# Patient Record
Sex: MALE | Race: WHITE | Employment: FULL TIME
[De-identification: names, ages, dates, MRNs, and addresses within clinical notes are randomized per-mention and may not be internally consistent; named-entity substitution may affect disease eponyms.]

---

## 2023-04-06 ENCOUNTER — NURSE TRIAGE (OUTPATIENT)
Dept: OTHER | Facility: CLINIC | Age: 23
End: 2023-04-06

## 2023-04-06 ENCOUNTER — OFFICE VISIT (OUTPATIENT)
Dept: PRIMARY CARE CLINIC | Age: 23
End: 2023-04-06
Payer: COMMERCIAL

## 2023-04-06 VITALS
DIASTOLIC BLOOD PRESSURE: 62 MMHG | BODY MASS INDEX: 25.41 KG/M2 | WEIGHT: 198 LBS | SYSTOLIC BLOOD PRESSURE: 126 MMHG | OXYGEN SATURATION: 97 % | RESPIRATION RATE: 14 BRPM | HEART RATE: 117 BPM | HEIGHT: 74 IN

## 2023-04-06 DIAGNOSIS — R06.02 SOB (SHORTNESS OF BREATH): ICD-10-CM

## 2023-04-06 DIAGNOSIS — R73.09 IMPAIRED GLUCOSE REGULATION: ICD-10-CM

## 2023-04-06 DIAGNOSIS — Z11.4 ENCOUNTER FOR SCREENING FOR HIV: ICD-10-CM

## 2023-04-06 DIAGNOSIS — M60.9 MYOSITIS, UNSPECIFIED MYOSITIS TYPE, UNSPECIFIED SITE: ICD-10-CM

## 2023-04-06 DIAGNOSIS — R53.83 OTHER FATIGUE: ICD-10-CM

## 2023-04-06 DIAGNOSIS — R79.0 LOW MAGNESIUM LEVEL: ICD-10-CM

## 2023-04-06 DIAGNOSIS — Z87.898 HISTORY OF PROGRESSIVE WEAKNESS: ICD-10-CM

## 2023-04-06 DIAGNOSIS — Z00.00 ENCOUNTER FOR WELL ADULT EXAM WITHOUT ABNORMAL FINDINGS: Primary | ICD-10-CM

## 2023-04-06 DIAGNOSIS — Z23 NEED FOR VARICELLA VACCINE: ICD-10-CM

## 2023-04-06 DIAGNOSIS — Z11.59 ENCOUNTER FOR HEPATITIS C SCREENING TEST FOR LOW RISK PATIENT: ICD-10-CM

## 2023-04-06 DIAGNOSIS — E55.9 VITAMIN D DEFICIENCY: ICD-10-CM

## 2023-04-06 DIAGNOSIS — Z13.220 SCREENING CHOLESTEROL LEVEL: ICD-10-CM

## 2023-04-06 PROCEDURE — 99385 PREV VISIT NEW AGE 18-39: CPT | Performed by: INTERNAL MEDICINE

## 2023-04-06 SDOH — ECONOMIC STABILITY: FOOD INSECURITY: WITHIN THE PAST 12 MONTHS, YOU WORRIED THAT YOUR FOOD WOULD RUN OUT BEFORE YOU GOT MONEY TO BUY MORE.: NEVER TRUE

## 2023-04-06 SDOH — ECONOMIC STABILITY: FOOD INSECURITY: WITHIN THE PAST 12 MONTHS, THE FOOD YOU BOUGHT JUST DIDN'T LAST AND YOU DIDN'T HAVE MONEY TO GET MORE.: NEVER TRUE

## 2023-04-06 SDOH — ECONOMIC STABILITY: INCOME INSECURITY: HOW HARD IS IT FOR YOU TO PAY FOR THE VERY BASICS LIKE FOOD, HOUSING, MEDICAL CARE, AND HEATING?: NOT HARD AT ALL

## 2023-04-06 SDOH — HEALTH STABILITY: PHYSICAL HEALTH: ON AVERAGE, HOW MANY MINUTES DO YOU ENGAGE IN EXERCISE AT THIS LEVEL?: 60 MIN

## 2023-04-06 SDOH — HEALTH STABILITY: PHYSICAL HEALTH: ON AVERAGE, HOW MANY DAYS PER WEEK DO YOU ENGAGE IN MODERATE TO STRENUOUS EXERCISE (LIKE A BRISK WALK)?: 6 DAYS

## 2023-04-06 SDOH — ECONOMIC STABILITY: HOUSING INSECURITY
IN THE LAST 12 MONTHS, WAS THERE A TIME WHEN YOU DID NOT HAVE A STEADY PLACE TO SLEEP OR SLEPT IN A SHELTER (INCLUDING NOW)?: NO

## 2023-04-06 ASSESSMENT — PATIENT HEALTH QUESTIONNAIRE - PHQ9
SUM OF ALL RESPONSES TO PHQ QUESTIONS 1-9: 0
SUM OF ALL RESPONSES TO PHQ QUESTIONS 1-9: 0
1. LITTLE INTEREST OR PLEASURE IN DOING THINGS: 0
SUM OF ALL RESPONSES TO PHQ QUESTIONS 1-9: 0
2. FEELING DOWN, DEPRESSED OR HOPELESS: 0
SUM OF ALL RESPONSES TO PHQ QUESTIONS 1-9: 0
SUM OF ALL RESPONSES TO PHQ9 QUESTIONS 1 & 2: 0

## 2023-04-06 ASSESSMENT — ENCOUNTER SYMPTOMS
BACK PAIN: 0
CHEST TIGHTNESS: 0
TROUBLE SWALLOWING: 0
BLOOD IN STOOL: 0
SHORTNESS OF BREATH: 1
VOMITING: 0
SINUS PRESSURE: 0
DIARRHEA: 0
WHEEZING: 0
COUGH: 0
ABDOMINAL PAIN: 0
EYE PAIN: 0
EYE REDNESS: 0
COLOR CHANGE: 0
CONSTIPATION: 0
NAUSEA: 0
SORE THROAT: 0
ABDOMINAL DISTENTION: 0

## 2023-04-06 NOTE — TELEPHONE ENCOUNTER
Location of patient: [place]at Nazario call from [employer] at [employer] with Intern. Subjective: Caller states \"I am fatigued\"     Current Symptoms: Works out everyday, drinks plenty of fluids and electrolytes and eats healthy. Hard to go to work. Body aches. Wakes up with chest tightness on and off. Muscular tightness in chest in the mornings. Onset: several months ago; worsening    Associated Symptoms: reduced activity, increased sleepiness    Pain Severity: 0/10; N/A;     Temperature: denies fever     LMP: NA Pregnant: NA    Recommended disposition: Go to [facility] or Walk In as Backup    Care advice provided, patient verbalizes understanding; denies any other questions or concerns; instructed to call back for any new or worsening symptoms. Caller unsure if will go to [facility] or Walk In today. Warm transferred him to [facility], as he wants to get established with a new PCP. Attention Provider: Thank you for allowing me to participate in the care of your patient. The patient was connected to triage in response to information provided to the ECC/PSC. Please do not respond through this encounter as the response is not directed to a shared pool.     Reason for Disposition   MODERATE weakness (i.e., interferes with work, school, normal activities) and cause unknown  (Exceptions: Weakness with acute minor illness, or weakness from poor fluid intake.)    Protocols used: Weakness (Generalized) and Fatigue-ADULT-OH

## 2023-04-06 NOTE — ASSESSMENT & PLAN NOTE
We will get some CT chest.  Given history of progressive shortness of breath with study ended at the also like to rule out thymic structures.

## 2023-04-06 NOTE — PROGRESS NOTES
is not nervous/anxious. No Known Allergies      Prior to Visit Medications    Not on File       No past medical history on file. No past surgical history on file. Family History   Problem Relation Age of Onset    Cancer Maternal Grandfather         Prostate cancer       Social History     Tobacco Use    Smoking status: Never    Smokeless tobacco: Never   Substance Use Topics    Alcohol use: Yes     Alcohol/week: 2.0 standard drinks     Types: 2 Glasses of wine per week     Comment: Minimal    Drug use: Yes     Frequency: 1.0 times per week     Types: Marijuana (Weed)     Comment: THC and delta 9       Objective   /62 (Site: Left Upper Arm, Position: Sitting, Cuff Size: Large Adult)   Pulse (!) 117   Resp 14   Ht 6' 2.25\" (1.886 m)   Wt 198 lb (89.8 kg)   SpO2 97%   BMI 25.25 kg/m²   Wt Readings from Last 3 Encounters:   04/06/23 198 lb (89.8 kg)     There were no vitals filed for this visit. Physical Exam  Constitutional:       General: He is not in acute distress. Appearance: Normal appearance. He is not ill-appearing. HENT:      Head: Normocephalic and atraumatic. Right Ear: Tympanic membrane, ear canal and external ear normal. There is no impacted cerumen. Left Ear: Tympanic membrane, ear canal and external ear normal. There is no impacted cerumen. Mouth/Throat:      Mouth: Mucous membranes are moist.      Pharynx: No oropharyngeal exudate or posterior oropharyngeal erythema. Eyes:      Extraocular Movements: Extraocular movements intact. Conjunctiva/sclera: Conjunctivae normal.      Pupils: Pupils are equal, round, and reactive to light. Neck:      Vascular: No carotid bruit. Cardiovascular:      Rate and Rhythm: Normal rate and regular rhythm. Pulses: Normal pulses. Heart sounds: Normal heart sounds. No murmur heard. No gallop. Pulmonary:      Effort: Pulmonary effort is normal.      Breath sounds: Normal breath sounds.  No wheezing,

## 2023-04-06 NOTE — PATIENT INSTRUCTIONS
with other people. Go for a walk with a friend, try a free online fitness class, or join a sports league. Eat a variety of healthy foods. These include fruits, vegetables, whole grains, low-fat dairy, and lean protein. Choose healthy portions of food. You can use the Nutrition Facts label on food packages as a guide. Eat more fruits and vegetables. You could add vegetables to sandwiches or add fruit to cereal.   Drink water when you are thirsty. Limit soda, juice, and sports drinks. Try to exercise most days. Aim for at least 2½ hours of exercise each week. Keep moving. Work in the garden or take your dog on a walk. Use the stairs instead of the elevator. If you use tobacco or nicotine, try to quit. Ask your doctor about programs and medicines to help you quit. Limit alcohol. Men should have no more than 2 drinks a day. Women should have no more than 1. For some people, no alcohol is the best choice. Follow-up care is a key part of your treatment and safety. Be sure to make and go to all appointments, and call your doctor if you are having problems. It's also a good idea to know your test results and keep a list of the medicines you take. Where can you learn more? Go to http://www.lynn.com/ and enter U807 to learn more about \"A Healthy Lifestyle: Care Instructions. \"  Current as of: November 14, 2022               Content Version: 13.6  © 2006-2023 Healthwise, [employer]. Care instructions adapted under license by [facility] ([facility]). If you have questions about a medical condition or this instruction, always ask your healthcare professional. [place] [address] any warranty or liability for your use of this information. Heart-Healthy Diet   Sodium, Fat, and Cholesterol Controlled Diet       What Is a Heart Healthy Diet? A heart-healthy diet is one that limits sodium , certain types of fat , and cholesterol .  This type of diet is recommended for:   People

## 2023-04-07 DIAGNOSIS — Z11.59 ENCOUNTER FOR HEPATITIS C SCREENING TEST FOR LOW RISK PATIENT: ICD-10-CM

## 2023-04-07 DIAGNOSIS — R73.09 IMPAIRED GLUCOSE REGULATION: ICD-10-CM

## 2023-04-07 DIAGNOSIS — Z13.220 SCREENING CHOLESTEROL LEVEL: ICD-10-CM

## 2023-04-07 DIAGNOSIS — R06.02 SOB (SHORTNESS OF BREATH): ICD-10-CM

## 2023-04-07 DIAGNOSIS — Z23 NEED FOR VARICELLA VACCINE: ICD-10-CM

## 2023-04-07 DIAGNOSIS — R53.83 OTHER FATIGUE: ICD-10-CM

## 2023-04-07 DIAGNOSIS — Z00.00 ENCOUNTER FOR WELL ADULT EXAM WITHOUT ABNORMAL FINDINGS: ICD-10-CM

## 2023-04-07 DIAGNOSIS — R79.0 LOW MAGNESIUM LEVEL: ICD-10-CM

## 2023-04-07 DIAGNOSIS — E55.9 VITAMIN D DEFICIENCY: ICD-10-CM

## 2023-04-07 DIAGNOSIS — M60.9 MYOSITIS, UNSPECIFIED MYOSITIS TYPE, UNSPECIFIED SITE: ICD-10-CM

## 2023-04-07 DIAGNOSIS — Z87.898 HISTORY OF PROGRESSIVE WEAKNESS: ICD-10-CM

## 2023-04-07 DIAGNOSIS — Z11.4 ENCOUNTER FOR SCREENING FOR HIV: ICD-10-CM

## 2023-04-07 LAB
25(OH)D3 SERPL-MCNC: 25.7 NG/ML
ALBUMIN SERPL-MCNC: 4.3 G/DL (ref 3.4–5)
ALBUMIN/GLOB SERPL: 2 {RATIO} (ref 1.1–2.2)
ALP SERPL-CCNC: 65 U/L (ref 40–129)
ALT SERPL-CCNC: 16 U/L (ref 10–40)
ANION GAP SERPL CALCULATED.3IONS-SCNC: 15 MMOL/L (ref 3–16)
AST SERPL-CCNC: 16 U/L (ref 15–37)
BASOPHILS # BLD: 0 K/UL (ref 0–0.2)
BASOPHILS NFR BLD: 0.3 %
BILIRUB SERPL-MCNC: 0.8 MG/DL (ref 0–1)
BUN SERPL-MCNC: 19 MG/DL (ref 7–20)
CALCIUM SERPL-MCNC: 9.5 MG/DL (ref 8.3–10.6)
CHLORIDE SERPL-SCNC: 103 MMOL/L (ref 99–110)
CHOLEST SERPL-MCNC: 179 MG/DL (ref 0–199)
CK SERPL-CCNC: 236 U/L (ref 39–308)
CO2 SERPL-SCNC: 22 MMOL/L (ref 21–32)
CREAT SERPL-MCNC: 1.1 MG/DL (ref 0.9–1.3)
CRP SERPL-MCNC: 54.5 MG/L (ref 0–5.1)
DEPRECATED RDW RBC AUTO: 12 % (ref 12.4–15.4)
EOSINOPHIL # BLD: 0.1 K/UL (ref 0–0.6)
EOSINOPHIL NFR BLD: 0.7 %
ERYTHROCYTE [SEDIMENTATION RATE] IN BLOOD BY WESTERGREN METHOD: 8 MM/HR (ref 0–15)
EST. AVERAGE GLUCOSE BLD GHB EST-MCNC: 91.1 MG/DL
FOLATE SERPL-MCNC: 10.48 NG/ML (ref 4.78–24.2)
GFR SERPLBLD CREATININE-BSD FMLA CKD-EPI: >60 ML/MIN/{1.73_M2}
GLUCOSE SERPL-MCNC: 94 MG/DL (ref 70–99)
HBA1C MFR BLD: 4.8 %
HCT VFR BLD AUTO: 43.8 % (ref 40.5–52.5)
HDLC SERPL-MCNC: 65 MG/DL (ref 40–60)
HGB BLD-MCNC: 15.1 G/DL (ref 13.5–17.5)
LDLC SERPL CALC-MCNC: 106 MG/DL
LYMPHOCYTES # BLD: 2.1 K/UL (ref 1–5.1)
LYMPHOCYTES NFR BLD: 15.1 %
MAGNESIUM SERPL-MCNC: 2 MG/DL (ref 1.8–2.4)
MCH RBC QN AUTO: 30 PG (ref 26–34)
MCHC RBC AUTO-ENTMCNC: 34.4 G/DL (ref 31–36)
MCV RBC AUTO: 87.3 FL (ref 80–100)
MONOCYTES # BLD: 0.9 K/UL (ref 0–1.3)
MONOCYTES NFR BLD: 6.3 %
NEUTROPHILS # BLD: 10.7 K/UL (ref 1.7–7.7)
NEUTROPHILS NFR BLD: 77.6 %
PLATELET # BLD AUTO: 136 K/UL (ref 135–450)
PMV BLD AUTO: 11 FL (ref 5–10.5)
POTASSIUM SERPL-SCNC: 4.3 MMOL/L (ref 3.5–5.1)
PROT SERPL-MCNC: 6.5 G/DL (ref 6.4–8.2)
RBC # BLD AUTO: 5.01 M/UL (ref 4.2–5.9)
SODIUM SERPL-SCNC: 140 MMOL/L (ref 136–145)
TRIGL SERPL-MCNC: 42 MG/DL (ref 0–150)
TSH SERPL DL<=0.005 MIU/L-ACNC: 1.86 UIU/ML (ref 0.27–4.2)
VIT B12 SERPL-MCNC: 815 PG/ML (ref 211–911)
VLDLC SERPL CALC-MCNC: 8 MG/DL
WBC # BLD AUTO: 13.8 K/UL (ref 4–11)

## 2023-04-08 LAB
ANA SER QL IA: NEGATIVE
HIV 1+2 AB+HIV1 P24 AG SERPL QL IA: NORMAL
HIV 2 AB SERPL QL IA: NORMAL
HIV1 AB SERPL QL IA: NORMAL
HIV1 P24 AG SERPL QL IA: NORMAL
VZV IGG SER QL IA: NORMAL

## 2023-04-09 DIAGNOSIS — Z87.898 HISTORY OF PROGRESSIVE WEAKNESS: Primary | ICD-10-CM

## 2023-04-09 LAB
HCV AB S/CO SERPL IA: 0.12 IV
HCV AB SERPL QL IA: NEGATIVE

## 2023-04-13 LAB — ACHR BLOCK AB/ACHR TOTAL SFR SER: 6 % (ref 0–26)

## 2023-04-16 LAB — ACHR MOD AB/ACHR TOTAL SFR SER: 8 %

## 2023-04-18 ENCOUNTER — TELEPHONE (OUTPATIENT)
Dept: PRIMARY CARE CLINIC | Age: 23
End: 2023-04-18

## 2023-04-18 NOTE — TELEPHONE ENCOUNTER
I spoke with patient to see if he had contacted Dr. Ck Rubin neurology for an appointment. He stated that he was not going to schedule a neurology evaluation. Patient stated that he has not made up his mind if he is going to seek another PCP or stay with [facility].

## 2023-04-25 ENCOUNTER — TELEPHONE (OUTPATIENT)
Dept: PRIMARY CARE CLINIC | Age: 23
End: 2023-04-25

## 2023-04-25 NOTE — TELEPHONE ENCOUNTER
Called patient today, call went to voicemail. Left a message asking that patient to call the office at telephone #[phone] at his earliest convenience so we could follow-up on his upper extremity weakness. Patient was asked whether he plans on continuing on with myself as his primary care provider, or whether he preferred to establish with a different PCP. Patient was told if he did plan on continuing on with me, I would like him to get a brain MRI and also have him follow-up with neurology. He was given options to establish with a neurologist with [facility] neuroscience, in the event  [facility] neurology was scheduling appointments 3-4 months out.

## 2023-05-06 PROBLEM — Z00.00 ENCOUNTER FOR WELL ADULT EXAM WITHOUT ABNORMAL FINDINGS: Status: RESOLVED | Noted: 2023-04-06 | Resolved: 2023-05-06

## 2024-06-18 ASSESSMENT — PATIENT HEALTH QUESTIONNAIRE - PHQ9
SUM OF ALL RESPONSES TO PHQ9 QUESTIONS 1 & 2: 0
1. LITTLE INTEREST OR PLEASURE IN DOING THINGS: NOT AT ALL
2. FEELING DOWN, DEPRESSED OR HOPELESS: NOT AT ALL
SUM OF ALL RESPONSES TO PHQ QUESTIONS 1-9: 0
1. LITTLE INTEREST OR PLEASURE IN DOING THINGS: NOT AT ALL
SUM OF ALL RESPONSES TO PHQ9 QUESTIONS 1 & 2: 0
2. FEELING DOWN, DEPRESSED OR HOPELESS: NOT AT ALL
SUM OF ALL RESPONSES TO PHQ QUESTIONS 1-9: 0

## 2024-06-21 ENCOUNTER — OFFICE VISIT (OUTPATIENT)
Dept: PRIMARY CARE CLINIC | Age: 24
End: 2024-06-21
Payer: COMMERCIAL

## 2024-06-21 VITALS
WEIGHT: 203.4 LBS | RESPIRATION RATE: 14 BRPM | SYSTOLIC BLOOD PRESSURE: 124 MMHG | HEIGHT: 74 IN | TEMPERATURE: 98.3 F | OXYGEN SATURATION: 99 % | BODY MASS INDEX: 26.1 KG/M2 | DIASTOLIC BLOOD PRESSURE: 64 MMHG | HEART RATE: 70 BPM

## 2024-06-21 DIAGNOSIS — E55.9 VITAMIN D DEFICIENCY: ICD-10-CM

## 2024-06-21 DIAGNOSIS — Z11.1 TUBERCULOSIS SCREENING: ICD-10-CM

## 2024-06-21 DIAGNOSIS — Z87.898 HISTORY OF PROGRESSIVE WEAKNESS: ICD-10-CM

## 2024-06-21 DIAGNOSIS — Z23 NEED FOR TDAP VACCINATION: ICD-10-CM

## 2024-06-21 DIAGNOSIS — Z13.220 SCREENING CHOLESTEROL LEVEL: ICD-10-CM

## 2024-06-21 DIAGNOSIS — Z00.00 ENCOUNTER FOR WELL ADULT EXAM WITHOUT ABNORMAL FINDINGS: ICD-10-CM

## 2024-06-21 DIAGNOSIS — Z71.89 ACP (ADVANCE CARE PLANNING): ICD-10-CM

## 2024-06-21 DIAGNOSIS — R79.82 ELEVATED C-REACTIVE PROTEIN (CRP): ICD-10-CM

## 2024-06-21 DIAGNOSIS — R73.09 IMPAIRED GLUCOSE REGULATION: ICD-10-CM

## 2024-06-21 DIAGNOSIS — Z11.4 ENCOUNTER FOR SCREENING FOR HIV: ICD-10-CM

## 2024-06-21 DIAGNOSIS — R53.83 OTHER FATIGUE: ICD-10-CM

## 2024-06-21 DIAGNOSIS — Z00.00 ENCOUNTER FOR WELL ADULT EXAM WITHOUT ABNORMAL FINDINGS: Primary | ICD-10-CM

## 2024-06-21 LAB
25(OH)D3 SERPL-MCNC: 33.7 NG/ML
BASOPHILS # BLD: 0 K/UL (ref 0–0.2)
BASOPHILS NFR BLD: 0.6 %
DEPRECATED RDW RBC AUTO: 12.2 % (ref 12.4–15.4)
EOSINOPHIL # BLD: 0.2 K/UL (ref 0–0.6)
EOSINOPHIL NFR BLD: 2.7 %
HCT VFR BLD AUTO: 45.4 % (ref 40.5–52.5)
HGB BLD-MCNC: 15.9 G/DL (ref 13.5–17.5)
LYMPHOCYTES # BLD: 2.3 K/UL (ref 1–5.1)
LYMPHOCYTES NFR BLD: 36 %
MCH RBC QN AUTO: 30.6 PG (ref 26–34)
MCHC RBC AUTO-ENTMCNC: 34.9 G/DL (ref 31–36)
MCV RBC AUTO: 87.5 FL (ref 80–100)
MONOCYTES # BLD: 0.5 K/UL (ref 0–1.3)
MONOCYTES NFR BLD: 7.5 %
NEUTROPHILS # BLD: 3.3 K/UL (ref 1.7–7.7)
NEUTROPHILS NFR BLD: 53.2 %
PLATELET # BLD AUTO: 191 K/UL (ref 135–450)
PMV BLD AUTO: 10.6 FL (ref 5–10.5)
RBC # BLD AUTO: 5.2 M/UL (ref 4.2–5.9)
WBC # BLD AUTO: 6.3 K/UL (ref 4–11)

## 2024-06-21 PROCEDURE — 90471 IMMUNIZATION ADMIN: CPT | Performed by: INTERNAL MEDICINE

## 2024-06-21 PROCEDURE — 90715 TDAP VACCINE 7 YRS/> IM: CPT | Performed by: INTERNAL MEDICINE

## 2024-06-21 PROCEDURE — 99395 PREV VISIT EST AGE 18-39: CPT | Performed by: INTERNAL MEDICINE

## 2024-06-21 SDOH — ECONOMIC STABILITY: FOOD INSECURITY: WITHIN THE PAST 12 MONTHS, THE FOOD YOU BOUGHT JUST DIDN'T LAST AND YOU DIDN'T HAVE MONEY TO GET MORE.: NEVER TRUE

## 2024-06-21 SDOH — ECONOMIC STABILITY: FOOD INSECURITY: WITHIN THE PAST 12 MONTHS, YOU WORRIED THAT YOUR FOOD WOULD RUN OUT BEFORE YOU GOT MONEY TO BUY MORE.: NEVER TRUE

## 2024-06-21 SDOH — ECONOMIC STABILITY: INCOME INSECURITY: HOW HARD IS IT FOR YOU TO PAY FOR THE VERY BASICS LIKE FOOD, HOUSING, MEDICAL CARE, AND HEATING?: NOT HARD AT ALL

## 2024-06-21 ASSESSMENT — ENCOUNTER SYMPTOMS
SHORTNESS OF BREATH: 0
EYE PAIN: 0
CHEST TIGHTNESS: 0
ABDOMINAL PAIN: 0
CONSTIPATION: 0
TROUBLE SWALLOWING: 0
EYE REDNESS: 0
DIARRHEA: 0
SORE THROAT: 0
WHEEZING: 0
NAUSEA: 0
BACK PAIN: 0
COUGH: 0
BLOOD IN STOOL: 0
ABDOMINAL DISTENTION: 0
VOMITING: 0
SINUS PRESSURE: 0
COLOR CHANGE: 0

## 2024-06-21 NOTE — ASSESSMENT & PLAN NOTE
nausea patient was assessed for progressive muscular weakness  patient's symptoms have resolved.  Patient had elevated CRP 54.5  Patient never had the opportunity to follow-up with neurology.  Will repeat CRP

## 2024-06-21 NOTE — PROGRESS NOTES
Well Adult Note  Name: Ernie Stallworth Today’s Date: 2024   MRN: [id number] Sex: Male   Age: 24 y.o. Ethnicity: Non- / Non    : 2000 Race: White (non-)      Ernie Stallworth is here for well adult exam.  History:  Wellness exam - Patient presents today for annual physical. Patient  reports feeling well. Patient has a normal appetite. Patient  eats 5+ servings of vegetables/fruits each day. Patient prepares food at home multiple times/day, eats in restaurants rarely. Patient  states that he sleeps well and gets 7-8 hours of sleep on average. In regards to emotional health, patient  denies depression or anxiety. Libido is considered to be normal. In regards to bowel habits, patient  has no complaints. Regarding urination, no complaints. Patient reports they feels safe at home, uses seat belts and has smoke detectors. Patient has a good work-life balance, and is happy with his job.       Health Maintenance    Annual retinal eye exam -  s/p Lasik surgery  Annual Dentist visit - Patient due  will need to schedule   Tobacco smoking -  NO  Alcohol Misuse -  NO  Illicit Drug Use-infrequent THC Gummies use  Depression screen -  Healthy Diet and physical activity - YES  Obesity Screen- screened  Wears seat belt-  YES  End of life directives discussed with patient.-Mentions he does not have  a will/or/an advanced directives, an all his paperwork is in order. Was instructed to start process looking into preparing his will an advanced directives.       Review of Systems   Constitutional:  Negative for activity change, appetite change, chills, fatigue, fever and unexpected weight change.   HENT:  Negative for congestion, ear pain, postnasal drip, sinus pressure, sore throat, tinnitus and trouble swallowing.    Eyes:  Negative for pain and redness.   Respiratory:  Negative for cough, chest tightness, shortness of breath and wheezing.    Cardiovascular:  Negative for chest pain, palpitations and leg

## 2024-06-21 NOTE — ASSESSMENT & PLAN NOTE
patient's symptoms have resolved.  The only positive in his lab work was elevated CRP 54.5  Patient never had the opportunity to follow-up with neurology.  Will repeat CRP

## 2024-06-21 NOTE — PATIENT INSTRUCTIONS
via [employer] so I can continue to help you       with them    Examine your lifestyle with an emphasis on BARRIERS to bad and good habits and how you can design your life to make better choices.    If you want to feel better these are the FUNDAMENTAL PILLARS of Wellness:    1)  You can choose to Get 150 min/week of moderate exercise (can talk but can't        sing) or 75 min/week of vigorous exercise (can't talk).   This will enhance your sense of well being (Exercise is as good as medicine for   depression.)    2)  You can choose to Get 7-9 hours of sleep per night    Detoxifies your brain, reduces risk of dementia    3)  You can choose to Strength Train 2 x a week on non-consecutive days   This will improve function and reduce risk of injury.   Body weight type exercises   such as Yoga and Pilates are excellent choices.    4)  You can choose Good Nutrition.  Only eat your goal weight (in lbs) x 10        calories/day and get 5 servings of Vegetables/day   Plant based diets reduce risk of heart attack/stroke and will help you feel full on   less food.   Avoid highly processed foods and processed carbohydrates.    5)  You can choose Moderate alcohol intake < 1-2 drinks/day   Alcohol will disrupt your sleep and add calories to your day.    6)  You can choose to Develop a Charismatic/Supportive relationship.     This will strengthen your resilience for the ups and downs.    7)  You can choose to Practice Mindfulness.     An hour a day of prayer/meditation/gratitude will change your life!    If you are trying to lose weight, here are some recommendations for weight loss:  Not every weight loss program is appropriate for everybody...  good online sources include Noom (more social with daily check ins), Lifesum (similar but less social) and Naturally slim, as well as Brandneu ($1500)    The GI Diet or \"Primal diet\", Intermittent fasting can also be effective choices.    If you have diabetes treated with insulin be sure to

## 2024-06-22 LAB
ALBUMIN SERPL-MCNC: 4.7 G/DL (ref 3.4–5)
ALBUMIN/GLOB SERPL: 2 {RATIO} (ref 1.1–2.2)
ALP SERPL-CCNC: 73 U/L (ref 40–129)
ALT SERPL-CCNC: 21 U/L (ref 10–40)
ANION GAP SERPL CALCULATED.3IONS-SCNC: 12 MMOL/L (ref 3–16)
AST SERPL-CCNC: 22 U/L (ref 15–37)
BILIRUB SERPL-MCNC: 0.5 MG/DL (ref 0–1)
BUN SERPL-MCNC: 22 MG/DL (ref 7–20)
CALCIUM SERPL-MCNC: 10.1 MG/DL (ref 8.3–10.6)
CHLORIDE SERPL-SCNC: 102 MMOL/L (ref 99–110)
CHOLEST SERPL-MCNC: 201 MG/DL (ref 0–199)
CO2 SERPL-SCNC: 25 MMOL/L (ref 21–32)
CREAT SERPL-MCNC: 1.1 MG/DL (ref 0.9–1.3)
CRP SERPL-MCNC: <3 MG/L (ref 0–5.1)
EST. AVERAGE GLUCOSE BLD GHB EST-MCNC: 93.9 MG/DL
GFR SERPLBLD CREATININE-BSD FMLA CKD-EPI: >90 ML/MIN/{1.73_M2}
GLUCOSE SERPL-MCNC: 100 MG/DL (ref 70–99)
HBA1C MFR BLD: 4.9 %
HDLC SERPL-MCNC: 59 MG/DL (ref 40–60)
HIV 1+2 AB+HIV1 P24 AG SERPL QL IA: NORMAL
HIV 2 AB SERPL QL IA: NORMAL
HIV1 AB SERPL QL IA: NORMAL
HIV1 P24 AG SERPL QL IA: NORMAL
LDLC SERPL CALC-MCNC: 127 MG/DL
POTASSIUM SERPL-SCNC: 5 MMOL/L (ref 3.5–5.1)
PROT SERPL-MCNC: 7 G/DL (ref 6.4–8.2)
SODIUM SERPL-SCNC: 139 MMOL/L (ref 136–145)
TRIGL SERPL-MCNC: 73 MG/DL (ref 0–150)
TSH SERPL DL<=0.005 MIU/L-ACNC: 1.61 UIU/ML (ref 0.27–4.2)
VLDLC SERPL CALC-MCNC: 15 MG/DL

## 2024-06-24 LAB
GAMMA INTERFERON BACKGROUND BLD IA-ACNC: 0.01 IU/ML
M TB IFN-G BLD-IMP: NEGATIVE
M TB IFN-G CD4+ BCKGRND COR BLD-ACNC: 0 IU/ML
M TB IFN-G CD4+CD8+ BCKGRND COR BLD-ACNC: 0.01 IU/ML
MITOGEN IGNF BCKGRD COR BLD-ACNC: 9.99 IU/ML

## 2024-06-24 NOTE — RESULT ENCOUNTER NOTE
Please let patient know  should be under 10 try to treat this with a low-fat diet and exercise.  His CRP has returned to his normal range.  Still has his white blood cell count.  Remaining labs are stable/negative.    QuantiFERON is pending we will let him know when it is resulted so he could  this paperwork.